# Patient Record
Sex: MALE | Race: BLACK OR AFRICAN AMERICAN | NOT HISPANIC OR LATINO | Employment: OTHER | URBAN - METROPOLITAN AREA
[De-identification: names, ages, dates, MRNs, and addresses within clinical notes are randomized per-mention and may not be internally consistent; named-entity substitution may affect disease eponyms.]

---

## 2018-06-21 LAB
CHOLEST SERPL-MSCNC: 192 MG/DL (ref 100–200)
HBA1C MFR BLD: 6.2 % (ref 4.8–5.9)
HDLC SERPL-MCNC: 112 MG/DL (ref 55–100)
LDL/HDL RATIO: 0.6 (ref 1–3)
LDLC SERPL CALC-MCNC: 72 MG/DL (ref 0–100)
TRIGL SERPL-MCNC: 41 MG/DL (ref 0–150)

## 2018-09-30 PROBLEM — I63.412 EMBOLIC STROKE INVOLVING LEFT MIDDLE CEREBRAL ARTERY: Status: ACTIVE | Noted: 2018-09-30

## 2019-02-15 RX ORDER — METOPROLOL SUCCINATE 25 MG/1
25 TABLET, EXTENDED RELEASE ORAL DAILY
COMMUNITY
End: 2019-05-17 | Stop reason: SDUPTHER

## 2019-02-15 RX ORDER — BACLOFEN 10 MG/1
10 TABLET ORAL 3 TIMES DAILY
COMMUNITY
End: 2019-02-19 | Stop reason: SDUPTHER

## 2019-02-15 RX ORDER — AMLODIPINE BESYLATE 5 MG/1
5 TABLET ORAL DAILY
COMMUNITY
End: 2019-11-19 | Stop reason: SDUPTHER

## 2019-02-15 RX ORDER — FINASTERIDE 5 MG/1
5 TABLET, FILM COATED ORAL DAILY
COMMUNITY
End: 2019-03-15 | Stop reason: SDUPTHER

## 2019-02-15 RX ORDER — ASPIRIN 81 MG/1
81 TABLET ORAL DAILY
COMMUNITY

## 2019-02-15 RX ORDER — ATORVASTATIN CALCIUM 40 MG/1
40 TABLET, FILM COATED ORAL DAILY
COMMUNITY
End: 2019-05-02 | Stop reason: SDUPTHER

## 2019-02-15 RX ORDER — HYDROCHLOROTHIAZIDE 25 MG/1
25 TABLET ORAL DAILY
COMMUNITY
End: 2019-11-11 | Stop reason: SDUPTHER

## 2019-02-15 RX ORDER — CLOPIDOGREL BISULFATE 75 MG/1
75 TABLET ORAL DAILY
COMMUNITY
End: 2019-05-02 | Stop reason: SDUPTHER

## 2019-02-15 RX ORDER — METFORMIN HYDROCHLORIDE 500 MG/1
500 TABLET ORAL DAILY
COMMUNITY
End: 2019-08-16 | Stop reason: SDUPTHER

## 2019-02-19 ENCOUNTER — OFFICE VISIT (OUTPATIENT)
Dept: FAMILY MEDICINE | Facility: CLINIC | Age: 74
End: 2019-02-19
Payer: MEDICARE

## 2019-02-19 VITALS
TEMPERATURE: 98 F | SYSTOLIC BLOOD PRESSURE: 144 MMHG | DIASTOLIC BLOOD PRESSURE: 92 MMHG | HEART RATE: 65 BPM | WEIGHT: 161.38 LBS | OXYGEN SATURATION: 97 %

## 2019-02-19 DIAGNOSIS — I63.412 EMBOLIC STROKE INVOLVING LEFT MIDDLE CEREBRAL ARTERY: Primary | ICD-10-CM

## 2019-02-19 DIAGNOSIS — M41.9 SCOLIOSIS, UNSPECIFIED SCOLIOSIS TYPE, UNSPECIFIED SPINAL REGION: ICD-10-CM

## 2019-02-19 DIAGNOSIS — I10 HYPERTENSION, UNSPECIFIED TYPE: ICD-10-CM

## 2019-02-19 PROCEDURE — 3080F PR MOST RECENT DIASTOLIC BLOOD PRESSURE >= 90 MM HG: ICD-10-PCS | Mod: CPTII,,, | Performed by: FAMILY MEDICINE

## 2019-02-19 PROCEDURE — 99213 OFFICE O/P EST LOW 20 MIN: CPT | Mod: ,,, | Performed by: FAMILY MEDICINE

## 2019-02-19 PROCEDURE — 3077F PR MOST RECENT SYSTOLIC BLOOD PRESSURE >= 140 MM HG: ICD-10-PCS | Mod: CPTII,,, | Performed by: FAMILY MEDICINE

## 2019-02-19 PROCEDURE — 3077F SYST BP >= 140 MM HG: CPT | Mod: CPTII,,, | Performed by: FAMILY MEDICINE

## 2019-02-19 PROCEDURE — 99213 PR OFFICE/OUTPT VISIT, EST, LEVL III, 20-29 MIN: ICD-10-PCS | Mod: ,,, | Performed by: FAMILY MEDICINE

## 2019-02-19 PROCEDURE — 3080F DIAST BP >= 90 MM HG: CPT | Mod: CPTII,,, | Performed by: FAMILY MEDICINE

## 2019-02-19 RX ORDER — BACLOFEN 10 MG/1
10 TABLET ORAL 3 TIMES DAILY
Qty: 90 TABLET | Refills: 3 | Status: SHIPPED | OUTPATIENT
Start: 2019-02-19 | End: 2019-11-25 | Stop reason: SDUPTHER

## 2019-02-19 NOTE — PROGRESS NOTES
Subjective:       Patient ID: Rene Fernandez is a 73 y.o. male.    Chief Complaint: Leg Pain (Pt stated that he has right leg pain especially in right knee.); Knee Pain (Pt stated that when he sits or lays down too long his right knee gets stiff & he has to push on it to straighten out.); and Pelvic Pain (Pt would like a brace ordered & an upright walker so that he may get around easier.)    74 yo M here for  Pt also tells me that I was correct with questioning his dx of scoliosis - pt found out that he has subluxation which is worsening steadily.   Pt also wants a back brace and a walker for his scoliosis and subluxation.  Pt had a stroke 6 months ago and he would like to have his carotids ultrasounded again as he had some stenosis 6 months ago.       Review of Systems   Constitutional: Negative for chills, fatigue and fever.   HENT: Negative for congestion, drooling, sneezing and sore throat.    Eyes: Negative for pain and visual disturbance.   Respiratory: Negative for cough and shortness of breath.    Cardiovascular: Negative for chest pain.   Gastrointestinal: Negative for abdominal pain, constipation, diarrhea and nausea.   Endocrine: Negative for cold intolerance and heat intolerance.   Genitourinary: Negative for difficulty urinating and frequency.   Musculoskeletal: Negative for myalgias.   Allergic/Immunologic: Negative for food allergies.   Neurological: Negative for seizures and headaches.   Psychiatric/Behavioral: Negative for behavioral problems.       Objective:      Physical Exam   Constitutional: He appears well-developed.   HENT:   Right Ear: External ear normal.   Left Ear: External ear normal.   Mouth/Throat: Oropharynx is clear and moist.   Eyes: Conjunctivae and EOM are normal.   Neck: Normal range of motion.   Cardiovascular: Normal rate, regular rhythm and intact distal pulses.   Pulmonary/Chest: Effort normal and breath sounds normal.   Abdominal: Soft.   Musculoskeletal: Normal range of  motion.   Neurological: He is alert.   Skin: Skin is warm. Capillary refill takes less than 2 seconds.   Psychiatric: He has a normal mood and affect.   Nursing note and vitals reviewed.      Assessment:       No diagnosis found.    Plan:       PROBLEM LIST  No problem-specific Assessment & Plan notes found for this encounter.        There are no diagnoses linked to this encounter.

## 2019-03-18 RX ORDER — FINASTERIDE 5 MG/1
TABLET, FILM COATED ORAL
Qty: 90 TABLET | Refills: 3 | Status: SHIPPED | OUTPATIENT
Start: 2019-03-18 | End: 2020-03-27

## 2019-05-02 ENCOUNTER — OFFICE VISIT (OUTPATIENT)
Dept: FAMILY MEDICINE | Facility: CLINIC | Age: 74
End: 2019-05-02
Payer: MEDICARE

## 2019-05-02 VITALS
BODY MASS INDEX: 23.66 KG/M2 | HEIGHT: 68 IN | HEART RATE: 84 BPM | SYSTOLIC BLOOD PRESSURE: 134 MMHG | DIASTOLIC BLOOD PRESSURE: 84 MMHG | WEIGHT: 156.13 LBS | TEMPERATURE: 97 F | OXYGEN SATURATION: 98 %

## 2019-05-02 DIAGNOSIS — Z86.73 HISTORY OF CVA IN ADULTHOOD: ICD-10-CM

## 2019-05-02 DIAGNOSIS — M62.830 MUSCLE SPASM OF BACK: Primary | ICD-10-CM

## 2019-05-02 DIAGNOSIS — R26.9 GAIT DIFFICULTY: ICD-10-CM

## 2019-05-02 DIAGNOSIS — M62.838 MUSCLE SPASM OF BOTH LOWER LEGS: ICD-10-CM

## 2019-05-02 DIAGNOSIS — I77.9 CAROTID ARTERY DISEASE, UNSPECIFIED LATERALITY, UNSPECIFIED TYPE: ICD-10-CM

## 2019-05-02 DIAGNOSIS — E11.9 TYPE 2 DIABETES MELLITUS WITHOUT COMPLICATION, WITHOUT LONG-TERM CURRENT USE OF INSULIN: ICD-10-CM

## 2019-05-02 DIAGNOSIS — I10 ESSENTIAL HYPERTENSION: ICD-10-CM

## 2019-05-02 PROCEDURE — 3079F DIAST BP 80-89 MM HG: CPT | Mod: CPTII,S$GLB,, | Performed by: FAMILY MEDICINE

## 2019-05-02 PROCEDURE — 3079F PR MOST RECENT DIASTOLIC BLOOD PRESSURE 80-89 MM HG: ICD-10-PCS | Mod: CPTII,S$GLB,, | Performed by: FAMILY MEDICINE

## 2019-05-02 PROCEDURE — 99213 PR OFFICE/OUTPT VISIT, EST, LEVL III, 20-29 MIN: ICD-10-PCS | Mod: S$GLB,,, | Performed by: FAMILY MEDICINE

## 2019-05-02 PROCEDURE — 3075F SYST BP GE 130 - 139MM HG: CPT | Mod: CPTII,S$GLB,, | Performed by: FAMILY MEDICINE

## 2019-05-02 PROCEDURE — 1101F PR PT FALLS ASSESS DOC 0-1 FALLS W/OUT INJ PAST YR: ICD-10-PCS | Mod: CPTII,S$GLB,, | Performed by: FAMILY MEDICINE

## 2019-05-02 PROCEDURE — 99213 OFFICE O/P EST LOW 20 MIN: CPT | Mod: S$GLB,,, | Performed by: FAMILY MEDICINE

## 2019-05-02 PROCEDURE — 1101F PT FALLS ASSESS-DOCD LE1/YR: CPT | Mod: CPTII,S$GLB,, | Performed by: FAMILY MEDICINE

## 2019-05-02 PROCEDURE — 3075F PR MOST RECENT SYSTOLIC BLOOD PRESS GE 130-139MM HG: ICD-10-PCS | Mod: CPTII,S$GLB,, | Performed by: FAMILY MEDICINE

## 2019-05-02 RX ORDER — ATORVASTATIN CALCIUM 40 MG/1
40 TABLET, FILM COATED ORAL DAILY
Qty: 90 TABLET | Refills: 3 | Status: SHIPPED | OUTPATIENT
Start: 2019-05-02 | End: 2019-05-17 | Stop reason: SDUPTHER

## 2019-05-02 RX ORDER — TIZANIDINE HYDROCHLORIDE 4 MG/1
4 CAPSULE, GELATIN COATED ORAL NIGHTLY PRN
Qty: 30 CAPSULE | Refills: 3 | Status: SHIPPED | OUTPATIENT
Start: 2019-05-02 | End: 2019-06-01

## 2019-05-02 RX ORDER — CLOPIDOGREL BISULFATE 75 MG/1
75 TABLET ORAL DAILY
Qty: 90 TABLET | Refills: 3 | Status: SHIPPED | OUTPATIENT
Start: 2019-05-02 | End: 2019-05-17 | Stop reason: SDUPTHER
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
## 2019-05-17 ENCOUNTER — OFFICE VISIT (OUTPATIENT)
Dept: FAMILY MEDICINE | Facility: CLINIC | Age: 74
End: 2019-05-17
Payer: MEDICARE

## 2019-05-17 VITALS
BODY MASS INDEX: 24.13 KG/M2 | DIASTOLIC BLOOD PRESSURE: 82 MMHG | SYSTOLIC BLOOD PRESSURE: 128 MMHG | HEART RATE: 58 BPM | HEIGHT: 68 IN | WEIGHT: 159.25 LBS | TEMPERATURE: 98 F | OXYGEN SATURATION: 96 %

## 2019-05-17 DIAGNOSIS — Z86.73 HISTORY OF CVA IN ADULTHOOD: ICD-10-CM

## 2019-05-17 DIAGNOSIS — M41.9 SCOLIOSIS, UNSPECIFIED SCOLIOSIS TYPE, UNSPECIFIED SPINAL REGION: Primary | ICD-10-CM

## 2019-05-17 DIAGNOSIS — E11.9 TYPE 2 DIABETES MELLITUS WITHOUT COMPLICATION, WITHOUT LONG-TERM CURRENT USE OF INSULIN: ICD-10-CM

## 2019-05-17 DIAGNOSIS — I77.9 CAROTID ARTERY DISEASE, UNSPECIFIED LATERALITY, UNSPECIFIED TYPE: ICD-10-CM

## 2019-05-17 DIAGNOSIS — R26.9 GAIT DIFFICULTY: ICD-10-CM

## 2019-05-17 PROCEDURE — 99213 OFFICE O/P EST LOW 20 MIN: CPT | Mod: S$GLB,,, | Performed by: FAMILY MEDICINE

## 2019-05-17 PROCEDURE — 3079F DIAST BP 80-89 MM HG: CPT | Mod: CPTII,S$GLB,, | Performed by: FAMILY MEDICINE

## 2019-05-17 PROCEDURE — 1101F PT FALLS ASSESS-DOCD LE1/YR: CPT | Mod: CPTII,S$GLB,, | Performed by: FAMILY MEDICINE

## 2019-05-17 PROCEDURE — 3079F PR MOST RECENT DIASTOLIC BLOOD PRESSURE 80-89 MM HG: ICD-10-PCS | Mod: CPTII,S$GLB,, | Performed by: FAMILY MEDICINE

## 2019-05-17 PROCEDURE — 1101F PR PT FALLS ASSESS DOC 0-1 FALLS W/OUT INJ PAST YR: ICD-10-PCS | Mod: CPTII,S$GLB,, | Performed by: FAMILY MEDICINE

## 2019-05-17 PROCEDURE — 99213 PR OFFICE/OUTPT VISIT, EST, LEVL III, 20-29 MIN: ICD-10-PCS | Mod: S$GLB,,, | Performed by: FAMILY MEDICINE

## 2019-05-17 PROCEDURE — 3074F SYST BP LT 130 MM HG: CPT | Mod: CPTII,S$GLB,, | Performed by: FAMILY MEDICINE

## 2019-05-17 PROCEDURE — 3074F PR MOST RECENT SYSTOLIC BLOOD PRESSURE < 130 MM HG: ICD-10-PCS | Mod: CPTII,S$GLB,, | Performed by: FAMILY MEDICINE

## 2019-05-17 RX ORDER — ATORVASTATIN CALCIUM 40 MG/1
40 TABLET, FILM COATED ORAL DAILY
Qty: 90 TABLET | Refills: 3 | Status: SHIPPED | OUTPATIENT
Start: 2019-05-17

## 2019-05-17 RX ORDER — METOPROLOL SUCCINATE 25 MG/1
25 TABLET, EXTENDED RELEASE ORAL DAILY
Qty: 90 TABLET | Refills: 3 | Status: SHIPPED | OUTPATIENT
Start: 2019-05-17

## 2019-05-17 RX ORDER — CLOPIDOGREL BISULFATE 75 MG/1
75 TABLET ORAL DAILY
Qty: 90 TABLET | Refills: 3 | Status: SHIPPED | OUTPATIENT
Start: 2019-05-17

## 2019-05-17 NOTE — PROGRESS NOTES
Subjective:       Patient ID: Rene Fernandez is a 73 y.o. male.    Chief Complaint: Right leg pain (Pt stated that he has sharp & constant pain that starts in his pelvic area and goes down to his ankle.); Left leg pain (Pt stated that he has occassional sharp pain that starts in his left knee that goes down to his left ankle.); Muscle Pain (Pt stated that he has constant muscle pain and would like to know if he needs blood work to see if there is something wrong besides inflammation.); and Medication Refill (Pt stated that he has no refills on Metoprolol, Atorvastatin, and Clopidogril. Pt was wondering if he still needs to take those meds.)    74 yo M here for f/u on pain 2ry to some subluxation issues in his back - pt has scoliosis and walks with a walker. Pt has had these problems for many years. [facility] and he has been seen by a chiropractor once a week. Pt stated that he has sharp & constant pain that starts in his pelvic area and goes down to his ankle.     Left leg pain: Pt stated that he has occassional sharp pain that starts in his left knee that goes down to his left ankle.  Muscle Pain: Pt stated that he has constant muscle pain and would like to know if he needs blood work to see if there is something wrong besides inflammation.  Medication Refill: Pt stated that he has no refills on Metoprolol, Atorvastatin, and Clopidogrel. Pt was wondering if he still needs to take those meds.    Pt has access to pool therapy and stretching PT as well as [facility] and [facility] but he has not transportation.       Review of Systems   Constitutional: Negative for chills, fatigue and fever.   HENT: Negative for congestion, drooling, sneezing and sore throat.    Eyes: Negative for pain and visual disturbance.   Respiratory: Negative for cough and shortness of breath.    Cardiovascular: Negative for chest pain.   Gastrointestinal: Negative for abdominal pain, constipation, diarrhea and  nausea.   Endocrine: Negative for cold intolerance and heat intolerance.   Genitourinary: Negative for difficulty urinating and frequency.   Musculoskeletal: Negative for myalgias.   Allergic/Immunologic: Negative for food allergies.   Neurological: Negative for seizures and headaches.   Psychiatric/Behavioral: Negative for behavioral problems.       Objective:      Physical Exam   Constitutional: He appears well-developed.   HENT:   Right Ear: External ear normal.   Left Ear: External ear normal.   Mouth/Throat: Oropharynx is clear and moist.   Eyes: Conjunctivae and EOM are normal.   Neck: Normal range of motion.   Cardiovascular: Normal rate, regular rhythm and intact distal pulses.   Pulmonary/Chest: Effort normal and breath sounds normal.   Abdominal: Soft.   Musculoskeletal: Normal range of motion. He exhibits deformity.   Neurological: He is alert.   Skin: Skin is warm. Capillary refill takes less than 2 seconds.   Psychiatric: He has a normal mood and affect.   Nursing note and vitals reviewed.      Assessment:       1. Scoliosis, unspecified scoliosis type, unspecified spinal region    2. Type 2 diabetes mellitus without complication, without long-term current use of insulin    3. Gait difficulty    4. History of CVA in adulthood    5. Carotid artery disease, unspecified laterality, unspecified type        Plan:       PROBLEM LIST     Rene was seen today for right leg pain, left leg pain, muscle pain and medication refill.    Diagnoses and all orders for this visit:    Scoliosis, unspecified scoliosis type, unspecified spinal region    Type 2 diabetes mellitus without complication, without long-term current use of insulin    Gait difficulty    History of CVA in adulthood  Comments:  middle cerebral artery  Orders:  -     metoprolol succinate (TOPROL-XL) 25 MG 24 hr tablet; Take 1 tablet (25 mg total) by mouth once daily.  -     atorvastatin (LIPITOR) 40 MG tablet; Take 1 tablet (40 mg total) by mouth once  daily.  -     clopidogrel (PLAVIX) 75 mg tablet; Take 1 tablet (75 mg total) by mouth once daily.    Carotid artery disease, unspecified laterality, unspecified type  -     metoprolol succinate (TOPROL-XL) 25 MG 24 hr tablet; Take 1 tablet (25 mg total) by mouth once daily.  -     atorvastatin (LIPITOR) 40 MG tablet; Take 1 tablet (40 mg total) by mouth once daily.  -     clopidogrel (PLAVIX) 75 mg tablet; Take 1 tablet (75 mg total) by mouth once daily.    1) I refilled your meds that you were out of  2) find a way to your work-out/physical therapy/stretching station some how - this will help you most  3) chiropractor is also good  4) get the imaging (ie CT scan, MRI, xrays - anything) that helps us find out what your actual condition is - so we can refer you

## 2019-08-18 RX ORDER — METFORMIN HYDROCHLORIDE 500 MG/1
TABLET ORAL
Qty: 90 TABLET | Refills: 1 | Status: SHIPPED | OUTPATIENT
Start: 2019-08-18

## 2019-11-11 RX ORDER — HYDROCHLOROTHIAZIDE 25 MG/1
TABLET ORAL
Qty: 90 TABLET | Refills: 3 | Status: SHIPPED | OUTPATIENT
Start: 2019-11-11

## 2019-11-22 RX ORDER — AMLODIPINE BESYLATE 5 MG/1
TABLET ORAL
Qty: 90 TABLET | Refills: 3 | Status: SHIPPED | OUTPATIENT
Start: 2019-11-22

## 2019-11-25 DIAGNOSIS — M41.9 SCOLIOSIS, UNSPECIFIED SCOLIOSIS TYPE, UNSPECIFIED SPINAL REGION: ICD-10-CM

## 2019-11-26 RX ORDER — BACLOFEN 10 MG/1
10 TABLET ORAL
Qty: 90 TABLET | Refills: 3 | Status: SHIPPED | OUTPATIENT
Start: 2019-11-26

## 2020-03-27 RX ORDER — FINASTERIDE 5 MG/1
TABLET, FILM COATED ORAL
Qty: 90 TABLET | Refills: 0 | Status: SHIPPED | OUTPATIENT
Start: 2020-03-27

## 2020-04-16 ENCOUNTER — PATIENT OUTREACH (OUTPATIENT)
Dept: ADMINISTRATIVE | Facility: HOSPITAL | Age: 75
End: 2020-04-16

## 2020-04-16 NOTE — PROGRESS NOTES
LINKS updated minus SSN. Immunizations abstracted. New immunizations added. HM updated. Care Everywhere abstracted;enter/edited hga1c & lipid from 2018  [facility]: no new records found [facility]: no new records found [facility]: no new records found.  *KDL*

## 2020-05-17 DIAGNOSIS — I77.9 CAROTID ARTERY DISEASE, UNSPECIFIED LATERALITY, UNSPECIFIED TYPE: ICD-10-CM

## 2020-05-17 DIAGNOSIS — Z86.73 HISTORY OF CVA IN ADULTHOOD: ICD-10-CM

## 2020-05-18 RX ORDER — ATORVASTATIN CALCIUM 40 MG/1
TABLET, FILM COATED ORAL
Qty: 90 TABLET | Refills: 0 | OUTPATIENT
Start: 2020-05-18

## 2020-05-27 DIAGNOSIS — I77.9 CAROTID ARTERY DISEASE, UNSPECIFIED LATERALITY, UNSPECIFIED TYPE: ICD-10-CM

## 2020-05-27 DIAGNOSIS — Z86.73 HISTORY OF CVA IN ADULTHOOD: ICD-10-CM

## 2020-05-27 RX ORDER — ATORVASTATIN CALCIUM 40 MG/1
TABLET, FILM COATED ORAL
Qty: 60 TABLET | Refills: 0 | OUTPATIENT
Start: 2020-05-27

## 2020-06-03 DIAGNOSIS — I77.9 CAROTID ARTERY DISEASE, UNSPECIFIED LATERALITY, UNSPECIFIED TYPE: ICD-10-CM

## 2020-06-03 DIAGNOSIS — Z86.73 HISTORY OF CVA IN ADULTHOOD: ICD-10-CM

## 2020-06-03 RX ORDER — ATORVASTATIN CALCIUM 40 MG/1
TABLET, FILM COATED ORAL
Qty: 60 TABLET | Refills: 0 | OUTPATIENT
Start: 2020-06-03

## 2020-06-04 DIAGNOSIS — Z86.73 HISTORY OF CVA IN ADULTHOOD: ICD-10-CM

## 2020-06-04 DIAGNOSIS — I77.9 CAROTID ARTERY DISEASE, UNSPECIFIED LATERALITY, UNSPECIFIED TYPE: ICD-10-CM

## 2020-06-04 RX ORDER — ATORVASTATIN CALCIUM 40 MG/1
TABLET, FILM COATED ORAL
Qty: 90 TABLET | Refills: 0 | OUTPATIENT
Start: 2020-06-04

## 2020-06-12 DIAGNOSIS — Z86.73 HISTORY OF CVA IN ADULTHOOD: ICD-10-CM

## 2020-06-12 DIAGNOSIS — I77.9 CAROTID ARTERY DISEASE, UNSPECIFIED LATERALITY, UNSPECIFIED TYPE: ICD-10-CM

## 2020-06-14 RX ORDER — CLOPIDOGREL BISULFATE 75 MG/1
TABLET ORAL
Qty: 90 TABLET | Refills: 0 | OUTPATIENT
Start: 2020-06-14

## 2020-07-02 RX ORDER — FINASTERIDE 5 MG/1
TABLET, FILM COATED ORAL
Qty: 90 TABLET | Refills: 0 | OUTPATIENT
Start: 2020-07-02

## 2022-11-07 ENCOUNTER — TELEPHONE (OUTPATIENT)
Dept: UROLOGY | Facility: CLINIC | Age: 77
End: 2022-11-07
Payer: OTHER GOVERNMENT

## 2022-11-07 DIAGNOSIS — R33.9 UNABLE TO PASS URINE: Primary | ICD-10-CM

## 2022-11-07 NOTE — TELEPHONE ENCOUNTER
----- Message from Luanne Kaiser sent at 11/7/2022  9:26 AM CST -----  Contact: [place] (spouse to Rene Fernandez)  Type:  Sooner Apoointment Request    Caller is requesting a sooner appointment.  Caller declined first available appointment listed below.  Caller will not accept being placed on the waitlist and is requesting a message be sent to doctor.  Name of Caller: [place] (spouse to Rene Fernandez)   When is the first available appointment? 1/5/2023 w/ Dr Gardner  Symptoms: enlarged prostate, has a catheter and it is causing him to be irritiable  Would the patient rather a call back or a response via [facility]?  Call back   Best Call Back Number: [phone]  Additional Information: N/a

## 2022-11-11 ENCOUNTER — OFFICE VISIT (OUTPATIENT)
Dept: UROLOGY | Facility: CLINIC | Age: 77
End: 2022-11-11
Payer: MEDICARE

## 2022-11-11 VITALS — SYSTOLIC BLOOD PRESSURE: 176 MMHG | HEART RATE: 74 BPM | TEMPERATURE: 99 F | DIASTOLIC BLOOD PRESSURE: 74 MMHG

## 2022-11-11 DIAGNOSIS — R33.9 UNABLE TO PASS URINE: ICD-10-CM

## 2022-11-11 DIAGNOSIS — N40.0 BPH (BENIGN PROSTATIC HYPERPLASIA): Primary | ICD-10-CM

## 2022-11-11 DIAGNOSIS — N40.0 BENIGN PROSTATIC HYPERPLASIA, UNSPECIFIED WHETHER LOWER URINARY TRACT SYMPTOMS PRESENT: Primary | ICD-10-CM

## 2022-11-11 LAB
ANION GAP SERPL CALC-SCNC: 8 MMOL/L (ref 3–11)
APPEARANCE, UA: ABNORMAL
BACTERIA SPEC CULT: ABNORMAL /HPF
BASOPHILS NFR BLD: 0.8 % (ref 0–3)
BILIRUB UR QL STRIP: ABNORMAL MG/DL
BUN SERPL-MCNC: 18 MG/DL (ref 7–18)
BUN/CREAT SERPL: 16.36 RATIO (ref 7–18)
CALCIUM SERPL-MCNC: 9.3 MG/DL (ref 8.8–10.5)
CHLORIDE SERPL-SCNC: 103 MMOL/L (ref 100–108)
CO2 SERPL-SCNC: 28 MMOL/L (ref 21–32)
COLOR UR: ABNORMAL
CREAT SERPL-MCNC: 1.1 MG/DL (ref 0.7–1.3)
EOSINOPHIL NFR BLD: 1.4 % (ref 1–3)
ERYTHROCYTE [DISTWIDTH] IN BLOOD BY AUTOMATED COUNT: 13.8 % (ref 12.5–18)
GFR ESTIMATION: > 60
GLUCOSE (UA): NORMAL MG/DL
GLUCOSE SERPL-MCNC: 84 MG/DL (ref 70–110)
HCT VFR BLD AUTO: 39.7 % (ref 42–52)
HGB BLD-MCNC: 12.5 G/DL (ref 14–18)
HGB UR QL STRIP: 250 /UL
KETONES UR QL STRIP: NEGATIVE MG/DL
LEUKOCYTE ESTERASE UR QL STRIP: 25 /UL
LYMPHOCYTES NFR BLD: 28.8 % (ref 25–40)
MCH RBC QN AUTO: 27.8 PG (ref 27–31.2)
MCHC RBC AUTO-ENTMCNC: 31.5 G/DL (ref 31.8–35.4)
MCV RBC AUTO: 88.4 FL (ref 80–97)
MONOCYTES NFR BLD: 10.1 % (ref 1–15)
NEUTROPHILS # BLD AUTO: 2.83 10*3/UL (ref 1.8–7.7)
NEUTROPHILS NFR BLD: 58.7 % (ref 37–80)
NITRITE UR QL STRIP: POSITIVE
NUCLEATED RED BLOOD CELLS: 0 %
PH UR STRIP: 5 PH (ref 5–9)
PLATELETS: 207 10*3/UL (ref 142–424)
POTASSIUM SERPL-SCNC: 3.6 MMOL/L (ref 3.6–5.2)
PROT UR QL STRIP: ABNORMAL MG/DL
RBC # BLD AUTO: 4.49 10*6/UL (ref 4.7–6.1)
RBC #/AREA URNS HPF: ABNORMAL /HPF (ref 0–2)
SERVICE COMMENT 03: ABNORMAL
SODIUM BLD-SCNC: 139 MMOL/L (ref 135–145)
SP GR UR STRIP: 1.01 (ref 1–1.03)
SPECIMEN COLLECTION METHOD, URINE: ABNORMAL
SQUAMOUS EPITHELIAL, UA: ABNORMAL /LPF
UROBILINOGEN UR STRIP-ACNC: 4 MG/DL
WBC # BLD: 4.8 10*3/UL (ref 4.6–10.2)
WBC #/AREA URNS HPF: ABNORMAL /HPF (ref 0–5)

## 2022-11-11 PROCEDURE — 99205 OFFICE O/P NEW HI 60 MIN: CPT | Mod: S$GLB,,, | Performed by: UROLOGY

## 2022-11-11 PROCEDURE — 1159F PR MEDICATION LIST DOCUMENTED IN MEDICAL RECORD: ICD-10-PCS | Mod: CPTII,S$GLB,, | Performed by: UROLOGY

## 2022-11-11 PROCEDURE — 99205 PR OFFICE/OUTPT VISIT, NEW, LEVL V, 60-74 MIN: ICD-10-PCS | Mod: S$GLB,,, | Performed by: UROLOGY

## 2022-11-11 PROCEDURE — 3077F SYST BP >= 140 MM HG: CPT | Mod: CPTII,S$GLB,, | Performed by: UROLOGY

## 2022-11-11 PROCEDURE — 3078F DIAST BP <80 MM HG: CPT | Mod: CPTII,S$GLB,, | Performed by: UROLOGY

## 2022-11-11 PROCEDURE — 1159F MED LIST DOCD IN RCRD: CPT | Mod: CPTII,S$GLB,, | Performed by: UROLOGY

## 2022-11-11 PROCEDURE — 1160F RVW MEDS BY RX/DR IN RCRD: CPT | Mod: CPTII,S$GLB,, | Performed by: UROLOGY

## 2022-11-11 PROCEDURE — 3078F PR MOST RECENT DIASTOLIC BLOOD PRESSURE < 80 MM HG: ICD-10-PCS | Mod: CPTII,S$GLB,, | Performed by: UROLOGY

## 2022-11-11 PROCEDURE — 1160F PR REVIEW ALL MEDS BY PRESCRIBER/CLIN PHARMACIST DOCUMENTED: ICD-10-PCS | Mod: CPTII,S$GLB,, | Performed by: UROLOGY

## 2022-11-11 PROCEDURE — 3077F PR MOST RECENT SYSTOLIC BLOOD PRESSURE >= 140 MM HG: ICD-10-PCS | Mod: CPTII,S$GLB,, | Performed by: UROLOGY

## 2022-11-11 RX ORDER — PHENAZOPYRIDINE HYDROCHLORIDE 200 MG/1
200 TABLET, FILM COATED ORAL 3 TIMES DAILY PRN
Qty: 30 TABLET | Refills: 0 | Status: SHIPPED | OUTPATIENT
Start: 2022-11-11 | End: 2022-11-21

## 2022-11-11 RX ORDER — CLOBETASOL PROPIONATE 0.5 MG/G
CREAM TOPICAL
COMMUNITY
Start: 2022-09-03

## 2022-11-11 RX ORDER — MELOXICAM 7.5 MG/1
7.5 TABLET ORAL 2 TIMES DAILY PRN
COMMUNITY
Start: 2022-10-11

## 2022-11-11 NOTE — PROGRESS NOTES
Subjective:       Patient ID: Rene Fernandez is a 77 y.o. male.    Chief Complaint: No chief complaint on file.      HPI:  77-year-old male with long history of BPH with obstructive symptoms patient has been on finasteride for quite some time but developed acute urinary retention requiring Hui catheter placement this was proximally a week ago    Past Medical History:   Past Medical History:   Diagnosis Date    Chronic kidney disease     Hyperlipidemia     Hypertension     Lumbar radiculopathy     Prediabetes     Scoliosis deformity of spine     Stroke        Past Surgical Historical:   Past Surgical History:   Procedure Laterality Date    EXCISIONAL HEMORRHOIDECTOMY      PROSTATE BIOPSY          Medications:   Medication List with Changes/Refills   Current Medications    AMLODIPINE (NORVASC) 5 MG TABLET    TAKE 1 TABLET BY MOUTH ONCE DAILY    ASPIRIN (ECOTRIN) 81 MG EC TABLET    Take 81 mg by mouth once daily.    ATORVASTATIN (LIPITOR) 40 MG TABLET    Take 1 tablet (40 mg total) by mouth once daily.    BACLOFEN (LIORESAL) 10 MG TABLET    Take 1 tablet (10 mg total) by mouth after meals as needed (for pain).    CLOBETASOL (TEMOVATE) 0.05 % CREAM    APPLY LIBERALLY TO SKIN TWICE A DAY AS NEEDED    CLOPIDOGREL (PLAVIX) 75 MG TABLET    Take 1 tablet (75 mg total) by mouth once daily.    FINASTERIDE (PROSCAR) 5 MG TABLET    Take 1 tablet by mouth once daily    HYDROCHLOROTHIAZIDE (HYDRODIURIL) 25 MG TABLET    TAKE 1 TABLET BY MOUTH ONCE DAILY    MELOXICAM (MOBIC) 7.5 MG TABLET    Take 7.5 mg by mouth 2 (two) times daily as needed.    METFORMIN (GLUCOPHAGE) 500 MG TABLET    TAKE 1 TABLET BY MOUTH ONCE DAILY    METOPROLOL SUCCINATE (TOPROL-XL) 25 MG 24 HR TABLET    Take 1 tablet (25 mg total) by mouth once daily.    WALKER MISC    Use when walking        Past Social History:   Social History     Socioeconomic History    Marital status:    Occupational History    Occupation: retired   Tobacco Use    Smoking status:  Never    Smokeless tobacco: Never   Substance and Sexual Activity    Alcohol use: No    Drug use: No       Allergies:   Review of patient's allergies indicates:   Allergen Reactions    Levaquin [levofloxacin]         Family History:   Family History   Family history unknown: Yes        Review of Systems:  Review of Systems   Constitutional:  Negative for activity change and appetite change.   HENT:  Negative for congestion and dental problem.    Eyes:  Negative for visual disturbance.   Respiratory:  Negative for chest tightness and shortness of breath.    Cardiovascular:  Negative for chest pain.   Gastrointestinal:  Negative for abdominal distention and abdominal pain.   Endocrine: Negative for polyuria.   Genitourinary:  Negative for difficulty urinating, dysuria, flank pain, frequency, hematuria, penile discharge, penile pain, penile swelling, scrotal swelling, testicular pain and urgency.   Musculoskeletal:  Negative for back pain and neck pain.   Skin:  Negative for color change.   Allergic/Immunologic: Positive for immunocompromised state.   Neurological:  Negative for dizziness.   Hematological:  Negative for adenopathy.   Psychiatric/Behavioral:  Negative for agitation, behavioral problems and confusion.      Physical Exam:  Physical Exam  Constitutional:       General: He is not in acute distress.     Appearance: He is well-developed.   HENT:      Head: Normocephalic and atraumatic.      Nose: Nose normal.   Eyes:      General: No scleral icterus.     Conjunctiva/sclera: Conjunctivae normal.      Pupils: Pupils are equal, round, and reactive to light.   Neck:      Thyroid: No thyromegaly.      Trachea: No tracheal deviation.   Cardiovascular:      Rate and Rhythm: Normal rate and regular rhythm.      Heart sounds: Normal heart sounds.   Pulmonary:      Effort: Pulmonary effort is normal. No respiratory distress.      Breath sounds: Normal breath sounds. No wheezing or rales.   Abdominal:      General:  Bowel sounds are normal. There is no distension.      Palpations: Abdomen is soft.      Tenderness: There is no abdominal tenderness. There is no guarding or rebound.   Genitourinary:     Penis: Normal. No tenderness.       Prostate: Normal.   Musculoskeletal:         General: No deformity. Normal range of motion.      Cervical back: Neck supple.   Lymphadenopathy:      Cervical: No cervical adenopathy.   Skin:     General: Skin is warm and dry.      Findings: No erythema or rash.   Neurological:      Mental Status: He is alert and oriented to person, place, and time.      Cranial Nerves: No cranial nerve deficit.   Psychiatric:         Behavior: Behavior normal.       Assessment/Plan:       Problem List Items Addressed This Visit    None  Visit Diagnoses       Benign prostatic hyperplasia, unspecified whether lower urinary tract symptoms present    -  Primary    Relevant Orders    Ambulatory Referral to External Surgery    Unable to pass urine                   Acute urinary retention with a long history of BPH:  We would a very long discussion about the causes of urinary retention the fact that he has been on finasteride for some time but developed acute urinary retention despite that we will proceed with Transurethral resection of the prostate next available date

## 2022-11-11 NOTE — PROGRESS NOTES
Patient educated, consent signed, pre-admission paperwork given. Patient verbalized understanding. DOS 11/21/22 at [facility]. Omid

## 2022-11-13 LAB
ORGANISM: NORMAL
URINE CULTURE, ROUTINE: NORMAL

## 2022-11-15 ENCOUNTER — TELEPHONE (OUTPATIENT)
Dept: UROLOGY | Facility: CLINIC | Age: 77
End: 2022-11-15
Payer: OTHER GOVERNMENT

## 2022-11-15 NOTE — TELEPHONE ENCOUNTER
Advised blood thinner needs to be stopped 5-7 days prior to surgery and to not take metformin AM of surgery. Only BP meds with a small sip of water. Pt wife verbalized understanding. Lpn

## 2022-11-15 NOTE — TELEPHONE ENCOUNTER
----- Message from Marleny Salazar sent at 11/15/2022  9:22 AM CST -----  Contact: [place] Fernandez (Spouse  Type:  Needs Medical Advice    Who Called: [place] Fernandez /Spouse of Rene Fernandez     Would the patient rather a call back or a response via [facility]? Call back   Best Call Back Number: [phone] / [phone]  Additional Information: she needs to know what medicines he needs to stop taking before his surgery on Monday 11/21

## 2022-11-16 ENCOUNTER — TELEPHONE (OUTPATIENT)
Dept: UROLOGY | Facility: CLINIC | Age: 77
End: 2022-11-16
Payer: OTHER GOVERNMENT

## 2022-11-16 NOTE — TELEPHONE ENCOUNTER
Attempted to return call.     ----- Message from Ana Felton NP sent at 11/16/2022  3:30 PM CST -----  Contact: reggie lentz    ----- Message -----  From: Merlyn Pearce MA  Sent: 11/16/2022   3:25 PM CST  To: Ana Felton NP    Will you pretty please review his results! Thank you!  ----- Message -----  From: Kaitlyn Campo  Sent: 11/16/2022   1:57 PM CST  To: Jj Song Staff      Who Called:demar   Who Left Message for Patient:georgette   Does the patient know what this is regarding?:in regards to pt   Would the patient rather a call back or a response via [facility]?   Best Call Back Number:[phone]  Additional Information:

## 2022-11-21 ENCOUNTER — OUTSIDE PLACE OF SERVICE (OUTPATIENT)
Dept: UROLOGY | Facility: CLINIC | Age: 77
End: 2022-11-21

## 2022-11-21 ENCOUNTER — TELEPHONE (OUTPATIENT)
Dept: UROLOGY | Facility: CLINIC | Age: 77
End: 2022-11-21

## 2022-11-21 ENCOUNTER — OUTSIDE PLACE OF SERVICE (OUTPATIENT)
Dept: UROLOGY | Facility: CLINIC | Age: 77
End: 2022-11-21
Payer: OTHER GOVERNMENT

## 2022-11-21 DIAGNOSIS — N40.0 BENIGN PROSTATIC HYPERPLASIA, UNSPECIFIED WHETHER LOWER URINARY TRACT SYMPTOMS PRESENT: Primary | ICD-10-CM

## 2022-11-21 LAB
GLUCOSE SERPL-MCNC: 50 MG/DL (ref 70–105)
GLUCOSE SERPL-MCNC: 87 MG/DL (ref 70–105)

## 2022-11-21 PROCEDURE — 52001 PR CYSTOURETHROSCOPY W/IRRIG & EVAC CLOTS: ICD-10-PCS | Mod: 78,,, | Performed by: UROLOGY

## 2022-11-21 PROCEDURE — 52601 PROSTATECTOMY (TURP): CPT | Mod: ,,, | Performed by: UROLOGY

## 2022-11-21 PROCEDURE — 52001 CYSTO W/IRRG&EVAC MLT CLOTS: CPT | Mod: 78,,, | Performed by: UROLOGY

## 2022-11-21 PROCEDURE — 52601 PR TRANSURETHRAL ELEC-SURG PROSTATECTOM: ICD-10-PCS | Mod: ,,, | Performed by: UROLOGY

## 2022-11-21 RX ORDER — HYDROCODONE BITARTRATE AND ACETAMINOPHEN 10; 325 MG/1; MG/1
1 TABLET ORAL EVERY 6 HOURS PRN
Qty: 15 TABLET | Refills: 0 | Status: SHIPPED | OUTPATIENT
Start: 2022-11-21 | End: 2022-11-21

## 2022-11-21 RX ORDER — SULFAMETHOXAZOLE AND TRIMETHOPRIM 800; 160 MG/1; MG/1
1 TABLET ORAL 2 TIMES DAILY
Qty: 6 TABLET | Refills: 0 | Status: SHIPPED | OUTPATIENT
Start: 2022-11-21 | End: 2022-11-24

## 2022-11-21 RX ORDER — HYDROCODONE BITARTRATE AND ACETAMINOPHEN 10; 325 MG/1; MG/1
1 TABLET ORAL EVERY 6 HOURS PRN
Qty: 15 TABLET | Refills: 0 | Status: SHIPPED | OUTPATIENT
Start: 2022-11-21

## 2022-11-21 NOTE — TELEPHONE ENCOUNTER
Contacted spouse and advised that rx is out at [facility] of choice, and asked where would they like the meds to go. Spouse advised Michel, rx will be sent. Spouse verbalized understanding. BJP    ----- Message from Tona Younger sent at 11/21/2022 10:32 AM CST -----  Contact: Elba([facility])    ----- Message -----  From: Noam Baker LPN  Sent: 11/21/2022  10:17 AM CST  To: Jose Francisco Olson, #    Message sent to the wrong Provider.   ----- Message -----  From: Sonia Reeves  Sent: 11/21/2022   9:49 AM CST  To: Jose Francisco Olson    [facility] pharmacy called to consult with nurse or staff regarding a medication they are needing to discuss with the office. She states they are out of stock and medication needs to be sent elsewhere. If needing to reach you the best number is [phone]. Thanks/MR

## 2022-11-22 LAB — SPECIMEN TO PATHOLOGY: NORMAL

## 2022-11-23 ENCOUNTER — TELEPHONE (OUTPATIENT)
Dept: UROLOGY | Facility: CLINIC | Age: 77
End: 2022-11-23
Payer: OTHER GOVERNMENT

## 2022-11-23 DIAGNOSIS — N40.0 BENIGN PROSTATIC HYPERPLASIA, UNSPECIFIED WHETHER LOWER URINARY TRACT SYMPTOMS PRESENT: Primary | ICD-10-CM

## 2022-11-23 RX ORDER — CLOTRIMAZOLE AND BETAMETHASONE DIPROPIONATE 10; .64 MG/G; MG/G
CREAM TOPICAL 2 TIMES DAILY
Qty: 1 G | Refills: 0 | Status: SHIPPED | OUTPATIENT
Start: 2022-11-23 | End: 2023-03-15

## 2022-11-23 NOTE — TELEPHONE ENCOUNTER
Contacted pt in regards to message left with the call center. Pt stated that he has swelling of the penis and a rash around penis area. Notified Jose Francisco Jansen and Jj HART aware. Pt notified that the pt is experiencing penile swelling from the past two procedures recently done. Should resolve on its own. Medication cream sent out to pharmacy. Pt will notify if anything else changes. ED

## 2022-11-23 NOTE — TELEPHONE ENCOUNTER
----- Message from Marleny Salazar sent at 11/23/2022  8:11 AM CST -----  Contact: [place] Fernandez wife  Type:  Same Day Appointment Request    Caller is requesting a same day appointment.  Caller declined first available appointment listed below.    Name of Caller:[place] Fernandez wife of Rene Fernandez   When is the first available appointment?1/24  Symptoms:penis swelling, in pain   Best Call Back Number: [phone]   Additional Information:

## 2022-12-08 ENCOUNTER — OFFICE VISIT (OUTPATIENT)
Dept: UROLOGY | Facility: CLINIC | Age: 77
End: 2022-12-08
Payer: OTHER GOVERNMENT

## 2022-12-08 VITALS — WEIGHT: 159 LBS | HEIGHT: 68 IN | BODY MASS INDEX: 24.1 KG/M2 | RESPIRATION RATE: 18 BRPM

## 2022-12-08 DIAGNOSIS — R31.0 GROSS HEMATURIA: ICD-10-CM

## 2022-12-08 DIAGNOSIS — Z97.8 PRESENCE OF INDWELLING FOLEY CATHETER: ICD-10-CM

## 2022-12-08 DIAGNOSIS — Z98.890 STATUS POST CYSTOSCOPY: Primary | ICD-10-CM

## 2022-12-08 PROCEDURE — 99024 PR POST-OP FOLLOW-UP VISIT: ICD-10-PCS | Mod: S$GLB,,, | Performed by: NURSE PRACTITIONER

## 2022-12-08 PROCEDURE — 99024 POSTOP FOLLOW-UP VISIT: CPT | Mod: S$GLB,,, | Performed by: NURSE PRACTITIONER

## 2022-12-08 RX ORDER — OXYBUTYNIN CHLORIDE 5 MG/1
5 TABLET ORAL 3 TIMES DAILY
Qty: 90 TABLET | Refills: 0 | Status: SHIPPED | OUTPATIENT
Start: 2022-12-08 | End: 2023-12-08

## 2022-12-08 NOTE — PROGRESS NOTES
Subjective:       Patient ID: Rene Fernandez is a 77 y.o. male.    Chief Complaint: cath leaking; hematuria; s/p Cysto, clot evac; and Indwelling Hui      HPI: 77-year-old male known to service presents postop patient complaining of leakage around his urinary catheter.  He had large clot evacuation from the bladder and urethra recently in the hospital setting.  Was found have oozing around the bladder neck and prostate area and seminal vessel area this was all fulgurated.  Urine in the  bag today looks clear.  He has been afebrile.  No other urologic concerns       Past Medical History:   Past Medical History:   Diagnosis Date    Chronic kidney disease     Hyperlipidemia     Hypertension     Lumbar radiculopathy     Prediabetes     Scoliosis deformity of spine     Stroke        Past Surgical Historical:   Past Surgical History:   Procedure Laterality Date    Cysto, Clot Evacuation  11/21/2022    EXCISIONAL HEMORRHOIDECTOMY      PROSTATE BIOPSY          Medications:   Medication List with Changes/Refills   Current Medications    AMLODIPINE (NORVASC) 5 MG TABLET    TAKE 1 TABLET BY MOUTH ONCE DAILY    ASPIRIN (ECOTRIN) 81 MG EC TABLET    Take 81 mg by mouth once daily.    ATORVASTATIN (LIPITOR) 40 MG TABLET    Take 1 tablet (40 mg total) by mouth once daily.    BACLOFEN (LIORESAL) 10 MG TABLET    Take 1 tablet (10 mg total) by mouth after meals as needed (for pain).    CLOBETASOL (TEMOVATE) 0.05 % CREAM    APPLY LIBERALLY TO SKIN TWICE A DAY AS NEEDED    CLOPIDOGREL (PLAVIX) 75 MG TABLET    Take 1 tablet (75 mg total) by mouth once daily.    CLOTRIMAZOLE-BETAMETHASONE 1-0.05% (LOTRISONE) CREAM    Apply topically 2 (two) times daily.    FINASTERIDE (PROSCAR) 5 MG TABLET    Take 1 tablet by mouth once daily    HYDROCHLOROTHIAZIDE (HYDRODIURIL) 25 MG TABLET    TAKE 1 TABLET BY MOUTH ONCE DAILY    HYDROCODONE-ACETAMINOPHEN (NORCO)  MG PER TABLET    Take 1 tablet by mouth every 6 (six) hours as needed for Pain.     MELOXICAM (MOBIC) 7.5 MG TABLET    Take 7.5 mg by mouth 2 (two) times daily as needed.    METFORMIN (GLUCOPHAGE) 500 MG TABLET    TAKE 1 TABLET BY MOUTH ONCE DAILY    METOPROLOL SUCCINATE (TOPROL-XL) 25 MG 24 HR TABLET    Take 1 tablet (25 mg total) by mouth once daily.    WALKER MISC    Use when walking        Past Social History:   Social History     Socioeconomic History    Marital status:    Occupational History    Occupation: retired   Tobacco Use    Smoking status: Never    Smokeless tobacco: Never   Substance and Sexual Activity    Alcohol use: No    Drug use: No       Allergies:   Review of patient's allergies indicates:   Allergen Reactions    Levaquin [levofloxacin]         Family History:   Family History   Family history unknown: Yes        Review of Systems:  Review of Systems   Constitutional:  Negative for activity change and appetite change.   HENT:  Negative for congestion and dental problem.    Eyes:  Negative for visual disturbance.   Respiratory:  Negative for chest tightness and shortness of breath.    Cardiovascular:  Negative for chest pain.   Gastrointestinal:  Negative for abdominal distention and abdominal pain.   Genitourinary:  Negative for decreased urine volume, difficulty urinating, dysuria, enuresis, flank pain, frequency, genital sores, hematuria, penile discharge, penile pain, penile swelling, scrotal swelling, testicular pain and urgency.   Musculoskeletal:  Negative for back pain and neck pain.   Skin:  Negative for color change.   Neurological:  Negative for dizziness.   Hematological:  Negative for adenopathy.   Psychiatric/Behavioral:  Negative for agitation, behavioral problems and confusion.      Physical Exam:  Physical Exam  Constitutional:       Appearance: He is well-developed.   HENT:      Head: Normocephalic.   Eyes:      General: No scleral icterus.  Pulmonary:      Effort: Pulmonary effort is normal.      Breath sounds: Normal breath sounds.   Abdominal:       General: There is no distension.      Palpations: Abdomen is soft.      Tenderness: There is no abdominal tenderness.      Hernia: No hernia is present. There is no hernia in the right inguinal area or left inguinal area.   Genitourinary:     Penis: Normal.       Testes: Normal. Cremasteric reflex is present.   Musculoskeletal:      Cervical back: Normal range of motion.   Skin:     General: Skin is warm and dry.   Neurological:      Mental Status: He is alert and oriented to person, place, and time.       Assessment/Plan:   Leakage around the urinary catheter--since the patient is having bladder spasms.  Rx oxybutynin 5 mg 1 p.o. t.i.d. p.r.n. spasms.  Have the nurse going the room and we readjusted the way that the patient has his tubing plug down his legs so that he has an easier better flow without obstruction as well.  Urinalysis from the bag WBC 20 5-50 RBC 25-50 no skin cells 4+ bacteria will culture urine treat if indicated.  Keep appointment with Dr. Gardner next week as  Problem List Items Addressed This Visit    None  Visit Diagnoses       Status post cystoscopy    -  Primary    Relevant Orders    POCT Urinalysis (w/Micro Option)    Gross hematuria        Relevant Orders    POCT Urinalysis (w/Micro Option)    Presence of indwelling Hui catheter        Relevant Orders    POCT Urinalysis (w/Micro Option)

## 2022-12-11 LAB — URINE CULTURE, ROUTINE: NORMAL

## 2022-12-14 ENCOUNTER — TELEPHONE (OUTPATIENT)
Dept: UROLOGY | Facility: CLINIC | Age: 77
End: 2022-12-14
Payer: OTHER GOVERNMENT

## 2022-12-14 ENCOUNTER — NURSE TRIAGE (OUTPATIENT)
Dept: ADMINISTRATIVE | Facility: CLINIC | Age: 77
End: 2022-12-14
Payer: OTHER GOVERNMENT

## 2022-12-14 NOTE — TELEPHONE ENCOUNTER
He has a catheter bag. Last night he started with a small amount of blood and today he has more clots coming out like a streaming line.   Excruciating pain penis. Pt had a TURP on Nov 21. Care advice recommend pt go to Er. Cg refused. Cg is requesting for pt to come in to office to see MD. Please call and advise cg/pt [phone]  Reason for Disposition   Patient sounds very sick or weak to the triager    Additional Information   Negative: Shock suspected (e.g., cold/pale/clammy skin, too weak to stand, low BP, rapid pulse)   Negative: Sounds like a life-threatening emergency to the triager   Negative: Catheter was accidentally pulled-out and bright red continuous bleeding   Negative: SEVERE abdominal pain   Negative: Fever > 100.4 F (38.0 C)   Negative: Drinking very little and dehydration suspected (e.g., no urine > 12 hours, very dry mouth, very lightheaded)    Protocols used: Urinary Catheter (e.g., Hui) Symptoms and [id number]

## 2022-12-14 NOTE — TELEPHONE ENCOUNTER
MARIALUISA Null spoke with pt. Pt proceeding to ER.    ----- Message from Rosibel Mata sent at 12/14/2022  8:05 AM CST -----  Contact: self  Pt wife called and stated that pt has a lot of blood cloths passing through his tube. Pt can be reached at [phone]

## 2022-12-15 ENCOUNTER — OFFICE VISIT (OUTPATIENT)
Dept: UROLOGY | Facility: CLINIC | Age: 77
End: 2022-12-15
Payer: OTHER GOVERNMENT

## 2022-12-15 DIAGNOSIS — R33.9 URINARY RETENTION: Primary | ICD-10-CM

## 2022-12-15 PROCEDURE — 99024 PR POST-OP FOLLOW-UP VISIT: ICD-10-PCS | Mod: S$GLB,,, | Performed by: UROLOGY

## 2022-12-15 PROCEDURE — 99024 POSTOP FOLLOW-UP VISIT: CPT | Mod: S$GLB,,, | Performed by: UROLOGY

## 2022-12-15 NOTE — PROGRESS NOTES
Postop TURP doing well he did have some episodes of hematuria he has been on Plavix it is too late in the afternoon to pull his Hui we have instructed the wife to pull the Hui 1st thing in the morning tomorrow and continue off of Plavix until the bleeding stops

## 2022-12-16 ENCOUNTER — CLINICAL SUPPORT (OUTPATIENT)
Dept: UROLOGY | Facility: CLINIC | Age: 77
End: 2022-12-16
Payer: OTHER GOVERNMENT

## 2022-12-16 NOTE — PROGRESS NOTES
Patient post-op TURP with clot evacuation performed on 11/21/2022. Patient was seen by Dr. Gardner for post-op f/u yesterday. Hui catheter was not removed yesterday due to the late afternoon time, wife was given supplies & instructions on how to remove catheter for first thing this AM. Upon arrival for nurse visit for cath removal, wife states that during the night Mr. Fernandez used scissors to cut catheter & removed it himself. She took him to [facility] ER around 4 AM. Hui catheter was replaced at that time. Urine in  bag was dark cranberry colored upon inspection. Wife also states that he passed clots during night as well.   Call placed to Dr. Gardner, new orders given to leave catheter in place at this time to allow bladder to rest & to continue to hold plavix until bleeding has ceased. Patient & wife verbalized understanding. Instructed to call with any questions or concerns.     JONNIE Sanchez RN

## 2022-12-20 ENCOUNTER — TELEPHONE (OUTPATIENT)
Dept: UROLOGY | Facility: CLINIC | Age: 77
End: 2022-12-20
Payer: OTHER GOVERNMENT

## 2022-12-20 NOTE — TELEPHONE ENCOUNTER
Pt scheduled for catheter removal.     ----- Message from Luanne Kaiser sent at 12/20/2022  8:17 AM CST -----  Contact: [place] (Spouse)  Type:  Needs Medical Advice    Who Called: [place] (Spouse)  Symptoms (please be specific): He's back to his normal urine color, needing to have his catheter removed       Would the patient rather a call back or a response via [facility]? Call back  Best Call Back Number: [phone]  Additional Information: N/a

## 2022-12-21 ENCOUNTER — CLINICAL SUPPORT (OUTPATIENT)
Dept: UROLOGY | Facility: CLINIC | Age: 77
End: 2022-12-21
Payer: OTHER GOVERNMENT

## 2022-12-21 NOTE — PROGRESS NOTES
Patient here with wife for catheter removal. Urine to  bag clear yellow. 18 fr catheter removed after deflating balloon with tip intact with no difficulty. Patient tolerated well. Patient urinated approximately 15 cc immediately after catheter removal. Instructed patient to drink fluids to maintain hydration. Also instructed that if patient has not urinated by 4 PM this afternoon to call. Instructed patient that he may resume his plavix at this time. Both verbalized understanding.     JONNIE Sanchez RN

## 2022-12-22 ENCOUNTER — TELEPHONE (OUTPATIENT)
Dept: UROLOGY | Facility: CLINIC | Age: 77
End: 2022-12-22
Payer: OTHER GOVERNMENT

## 2022-12-22 NOTE — TELEPHONE ENCOUNTER
No answer, left message for wife to call back. Left message informing that no pain medication could be called out, recommended alternating tylenol & ibuprofen for any pain. Also instructed that he felt that he has UTI then he could come by & drop off urine sample.     JONNIE Sanchez Rn              ----- Message from Luanne Kaiser sent at 12/22/2022  8:31 AM CST -----  Regarding: Medication  Contact: Wife-[place]  Per phone donato with patient wife they are wanting the provider to donato in medication due to pain from the catheter removal. Patient has pain during urination. Please return call [phone]    [facility] [address] [place] [address] - [address] [address]  Phone: [phone] Fax: [phone]

## 2022-12-27 ENCOUNTER — TELEPHONE (OUTPATIENT)
Dept: UROLOGY | Facility: CLINIC | Age: 77
End: 2022-12-27
Payer: OTHER GOVERNMENT

## 2022-12-27 DIAGNOSIS — N43.3 HYDROCELE, UNSPECIFIED HYDROCELE TYPE: Primary | ICD-10-CM

## 2022-12-27 NOTE — TELEPHONE ENCOUNTER
Pt wife called stating pt was in the ER over the weekend with a swollen testicle. Pt had U/S done, Dr. Gardner reviewed. Pt was given levaquin in the hospital. Dr. Gardner advised pt to finish abx and have additional U/S in 1 month. Pt wife verbalized understanding. U/S order placed.     ----- Message from Rosibel Mata sent at 12/27/2022  8:15 AM CST -----  Contact: self  Pt wife called and stated pt had to go to the ER and his right testicle is in flame as well as his penis swollen. Pt can be reached at [phone]

## 2023-01-27 ENCOUNTER — TELEPHONE (OUTPATIENT)
Dept: UROLOGY | Facility: CLINIC | Age: 78
End: 2023-01-27
Payer: OTHER GOVERNMENT

## 2023-01-27 NOTE — TELEPHONE ENCOUNTER
Spoke with Wife [place] & informed her that US showed improvement since Mr. Fernandez has completed his antibiotics. Mrs. Fernandez stated that he is feeling much better since he finished his antibiotics. Instructed to call if any other issues or concerns arise. Verbalized understanding.     JONNIE Sanchez RN                  ----- Message from Duncan Gardner MD sent at 1/27/2023 11:02 AM CST -----  Testicle doing better after finishing abx. Please call pt and ask how is he doing.

## 2023-03-15 DIAGNOSIS — N40.0 BENIGN PROSTATIC HYPERPLASIA, UNSPECIFIED WHETHER LOWER URINARY TRACT SYMPTOMS PRESENT: ICD-10-CM

## 2023-03-17 ENCOUNTER — TELEPHONE (OUTPATIENT)
Dept: UROLOGY | Facility: CLINIC | Age: 78
End: 2023-03-17
Payer: OTHER GOVERNMENT

## 2023-03-17 NOTE — TELEPHONE ENCOUNTER
----- Message from Sonia Reeves sent at 3/17/2023 11:21 AM CDT -----  Contact: Diane([facility])  Diane called to consult with nurse or staff regarding clarification she is needing on a prescription that was faxed over to them. She would like a call back and can be reached at [phone]. Thanks/MR

## 2023-03-17 NOTE — TELEPHONE ENCOUNTER
Called out clotrimazole-betamethosone as requested to [facility].     JONNIE Sanchez RN                          ----- Message from Luanne Kaiser sent at 3/17/2023  9:32 AM CDT -----  Contact: [place] (spouse)  [place] (spouse) spoke to Diana two days ago about having clotrimazole-betamethasone 1-0.05% (LOTRISONE) cream called out to [address] - [address] [address]  Phone: [phone] Fax: [phone]    And it has not been called out yet. Please call back at [phone] or please call out asap

## 2023-03-17 NOTE — TELEPHONE ENCOUNTER
Attempted to call pharmacy 3 times. I waited on hold about 5-7 minutes each time and kept getting hung  up on. Lpn

## 2023-03-20 RX ORDER — CLOTRIMAZOLE AND BETAMETHASONE DIPROPIONATE 10; .64 MG/G; MG/G
CREAM TOPICAL 2 TIMES DAILY
Qty: 2 G | Refills: 2 | Status: SHIPPED | OUTPATIENT
Start: 2023-03-20

## 2023-03-20 RX ORDER — CLOTRIMAZOLE AND BETAMETHASONE DIPROPIONATE 10; .64 MG/G; MG/G
CREAM TOPICAL 2 TIMES DAILY
Qty: 1 G | Refills: 0 | Status: SHIPPED | OUTPATIENT
Start: 2023-03-20

## 2023-06-02 ENCOUNTER — TELEPHONE (OUTPATIENT)
Dept: UROLOGY | Facility: CLINIC | Age: 78
End: 2023-06-02
Payer: OTHER GOVERNMENT

## 2023-06-02 DIAGNOSIS — N43.3 HYDROCELE, UNSPECIFIED HYDROCELE TYPE: Primary | ICD-10-CM

## 2023-06-02 NOTE — TELEPHONE ENCOUNTER
----- Message from Janna Schaeffer sent at 6/2/2023  8:42 AM CDT -----  Type:  Needs Medical Advice    Who Called:  Rene JJ Fernandez ( pt's wife [place] )     Symptoms (please be specific): -   How long has patient had these symptoms:  -  Pharmacy name and phone #:  -  Would the patient rather a call back or a response via [facility]?    Best Call Back Number: [phone]  Additional Information: needs to speak w/ nurse about pt , says he feels like theres an increase in fluid in his scrotum, please call

## 2023-06-02 NOTE — TELEPHONE ENCOUNTER
"Spoke with wife verified in inv of care she stated pt is having some build up in his testicles. Pt denies any pain or swelling, redness/tenderness unk. Pt denies any recent activity which would cause problems. Pt stated "it just feels different than normal". Pt had US in past to eval for hydrocele. We will place another order to repeat scan. Will need [facility]. Wife informed and educated to call us back next week if they haven't heard from anyone to sched. Educated to monitor symptoms and if they worsen to report to ER over the weekend. They verbalized understanding. hMLpn  "

## 2023-06-06 ENCOUNTER — TELEPHONE (OUTPATIENT)
Dept: UROLOGY | Facility: CLINIC | Age: 78
End: 2023-06-06
Payer: OTHER GOVERNMENT

## 2023-06-06 NOTE — TELEPHONE ENCOUNTER
Spoke with wife and informed we are still waiting on authorization. Spoke with [facility] who had to resubmit auth. Wife verbalized understanding. Informed her if pain/swelling worsens to report to ER if we have not received auth. [facility] verbalized understanding. [facility]

## 2023-06-06 NOTE — TELEPHONE ENCOUNTER
----- Message from Radha Carver sent at 6/6/2023 10:13 AM CDT -----  Contact: pt  Pt wife Sharon  calling wanted to know if Guillermina has heard anything from [facility] yet.  Pt can be reached at [phone]     Thanks,

## 2023-06-15 ENCOUNTER — TELEPHONE (OUTPATIENT)
Dept: UROLOGY | Facility: CLINIC | Age: 78
End: 2023-06-15
Payer: OTHER GOVERNMENT

## 2023-06-15 NOTE — TELEPHONE ENCOUNTER
Attempted to contact, lm advising spouse that us has been authorized by insurance they can proceed to have it done. BJP    ----- Message from Sonia Reeves sent at 6/15/2023  2:58 PM CDT -----  Contact: Mrs. Fernandez(wife)  Mrs. Hinton called to consult with nurse or staff regarding a missed call. She states she has been trying to get an ultrasound scheduled and thought the call would be regarding this. She would like a call back and can be reached at [phone]. Thanks/MR

## 2023-06-23 ENCOUNTER — TELEPHONE (OUTPATIENT)
Dept: UROLOGY | Facility: CLINIC | Age: 78
End: 2023-06-23
Payer: OTHER GOVERNMENT

## 2023-06-23 NOTE — TELEPHONE ENCOUNTER
Spoke with pt and informed of results.     ----- Message from Ana Felton NP sent at 6/23/2023  8:55 AM CDT -----  Please notify patient of ultrasound results. Small right hydrocele noted. Follow up as needed

## 2024-03-28 ENCOUNTER — OFFICE VISIT (OUTPATIENT)
Dept: UROLOGY | Facility: CLINIC | Age: 79
End: 2024-03-28
Payer: MEDICARE

## 2024-03-28 VITALS
WEIGHT: 158.94 LBS | BODY MASS INDEX: 24.09 KG/M2 | TEMPERATURE: 98 F | OXYGEN SATURATION: 98 % | HEIGHT: 68 IN | SYSTOLIC BLOOD PRESSURE: 117 MMHG | DIASTOLIC BLOOD PRESSURE: 70 MMHG

## 2024-03-28 DIAGNOSIS — R39.15 URINARY URGENCY: ICD-10-CM

## 2024-03-28 DIAGNOSIS — N13.8 BPH WITH URINARY OBSTRUCTION: Primary | ICD-10-CM

## 2024-03-28 DIAGNOSIS — R35.0 URINARY FREQUENCY: ICD-10-CM

## 2024-03-28 DIAGNOSIS — R33.9 INCOMPLETE BLADDER EMPTYING: ICD-10-CM

## 2024-03-28 DIAGNOSIS — N40.1 BPH WITH URINARY OBSTRUCTION: Primary | ICD-10-CM

## 2024-03-28 PROCEDURE — 99214 OFFICE O/P EST MOD 30 MIN: CPT | Mod: S$GLB,,, | Performed by: NURSE PRACTITIONER

## 2024-03-28 RX ORDER — DIVALPROEX SODIUM 250 MG/1
250 TABLET, FILM COATED, EXTENDED RELEASE ORAL
COMMUNITY

## 2024-03-28 RX ORDER — LEVOFLOXACIN 500 MG/1
500 TABLET, FILM COATED ORAL
COMMUNITY
Start: 2022-12-26

## 2024-03-28 RX ORDER — CEFDINIR 300 MG/1
CAPSULE ORAL
COMMUNITY
Start: 2024-03-23

## 2024-03-28 RX ORDER — FUROSEMIDE 20 MG/1
TABLET ORAL
COMMUNITY

## 2024-03-28 RX ORDER — TAMSULOSIN HYDROCHLORIDE 0.4 MG/1
0.4 CAPSULE ORAL DAILY
Qty: 30 CAPSULE | Refills: 11 | Status: SHIPPED | OUTPATIENT
Start: 2024-03-28 | End: 2025-03-28

## 2024-03-28 RX ORDER — MOXIFLOXACIN 5 MG/ML
SOLUTION/ DROPS OPHTHALMIC
COMMUNITY
Start: 2024-02-16

## 2024-03-28 RX ORDER — GLUCOSAM/CHONDRO/HERB 149/HYAL 750-100 MG
1000 TABLET ORAL
COMMUNITY

## 2024-03-28 RX ORDER — DONEPEZIL HYDROCHLORIDE 5 MG/1
5 TABLET, FILM COATED ORAL
COMMUNITY
Start: 2023-10-26

## 2024-03-28 RX ORDER — GABAPENTIN 100 MG/1
100 CAPSULE ORAL NIGHTLY
COMMUNITY
Start: 2023-10-30

## 2024-03-28 NOTE — PROGRESS NOTES
Subjective:       Patient ID: Rene Fernandez is a 78 y.o. male.    Chief Complaint: No chief complaint on file.      HPI: 78-year-old male, established patient, seen December 2022.    Patient presents with complaint of burning with urination.  Also has frequency, urgency, and occasional leakage.    Denies any odor to the urine.  Denies any fever or body aches.  Denies any unexpected weight loss.    He is on Proscar 5 mg daily.   He states the burning is when he starts to urinate.  States his stream is pretty good but we can towards the end.  No other urinary complaints at this time.         Past Medical History:   Past Medical History:   Diagnosis Date    Chronic kidney disease     Hyperlipidemia     Hypertension     Lumbar radiculopathy     Prediabetes     Scoliosis deformity of spine     Stroke        Past Surgical Historical:   Past Surgical History:   Procedure Laterality Date    Cysto, Clot Evacuation  11/21/2022    EXCISIONAL HEMORRHOIDECTOMY      PROSTATE BIOPSY          Medications:   Medication List with Changes/Refills   New Medications    TAMSULOSIN (FLOMAX) 0.4 MG CAP    Take 1 capsule (0.4 mg total) by mouth once daily.   Current Medications    AMLODIPINE (NORVASC) 5 MG TABLET    TAKE 1 TABLET BY MOUTH ONCE DAILY    ASPIRIN (ECOTRIN) 81 MG EC TABLET    Take 81 mg by mouth once daily.    ATORVASTATIN (LIPITOR) 40 MG TABLET    Take 1 tablet (40 mg total) by mouth once daily.    BACLOFEN (LIORESAL) 10 MG TABLET    Take 1 tablet (10 mg total) by mouth after meals as needed (for pain).    CALCIUM CARBONATE-VITAMIN D3 600 MG-20 MCG (800 UNIT) CHEW    1 tablet with a meal Orally Once a day    CEFDINIR (OMNICEF) 300 MG CAPSULE    TAKE 1 CAPSULE BY MOUTH TWICE DAILY FOR 3 DAYS    CLOBETASOL (TEMOVATE) 0.05 % CREAM    APPLY LIBERALLY TO SKIN TWICE A DAY AS NEEDED    CLOPIDOGREL (PLAVIX) 75 MG TABLET    Take 1 tablet (75 mg total) by mouth once daily.    CLOTRIMAZOLE-BETAMETHASONE 1-0.05% (LOTRISONE) CREAM     Apply topically 2 (two) times daily.    CLOTRIMAZOLE-BETAMETHASONE 1-0.05% (LOTRISONE) CREAM    Apply topically 2 (two) times daily.    DIVALPROEX ER (DEPAKOTE ER) 250 MG 24 HR TABLET    Take 250 mg by mouth.    DONEPEZIL (ARICEPT) 5 MG TABLET    Take 5 mg by mouth.    FINASTERIDE (PROSCAR) 5 MG TABLET    Take 1 tablet by mouth once daily    FUROSEMIDE (LASIX) 20 MG TABLET    TAKE 1 TABLET BY MOUTH ONCE DAILY FOR FLUID RETENTION    GABAPENTIN (NEURONTIN) 100 MG CAPSULE    Take 100 mg by mouth every evening.    HYDROCHLOROTHIAZIDE (HYDRODIURIL) 25 MG TABLET    TAKE 1 TABLET BY MOUTH ONCE DAILY    HYDROCODONE-ACETAMINOPHEN (NORCO)  MG PER TABLET    Take 1 tablet by mouth every 6 (six) hours as needed for Pain.    LEVOFLOXACIN (LEVAQUIN) 500 MG TABLET    500 mg.    MELOXICAM (MOBIC) 7.5 MG TABLET    Take 7.5 mg by mouth 2 (two) times daily as needed.    METFORMIN (GLUCOPHAGE) 500 MG TABLET    TAKE 1 TABLET BY MOUTH ONCE DAILY    METOPROLOL SUCCINATE (TOPROL-XL) 25 MG 24 HR TABLET    Take 1 tablet (25 mg total) by mouth once daily.    MOXIFLOXACIN (VIGAMOX) 0.5 % OPHTHALMIC SOLUTION    INSTILL 1 DROP FOUR TIMES DAILY FOR 1 DAY PREOP, THEN FOUR TIMES DAILY DAY OF SURGERY, THEN FOUR TIMES DAILY FOR 1 WEEK IN OPERATIVE EYE    OMEGA 3-DHA-EPA-FISH OIL 1,000 MG (120 MG-180 MG) CAP    1,000 mg.    OXYBUTYNIN (DITROPAN) 5 MG TAB    Take 1 tablet (5 mg total) by mouth 3 (three) times daily.    WALKER MISC    Use when walking        Past Social History:   Social History     Socioeconomic History    Marital status:    Occupational History    Occupation: retired   Tobacco Use    Smoking status: Never     Passive exposure: Past    Smokeless tobacco: Never   Substance and Sexual Activity    Alcohol use: No    Drug use: No       Allergies:   Review of patient's allergies indicates:   Allergen Reactions    Shellfish containing products     Simvastatin Itching        Family History:   Family History   Family history  unknown: Yes        Review of Systems:  Review of Systems   Constitutional:  Negative for activity change and appetite change.   HENT:  Negative for congestion and dental problem.    Eyes:  Negative for visual disturbance.   Respiratory:  Negative for chest tightness and shortness of breath.    Cardiovascular:  Negative for chest pain.   Gastrointestinal:  Negative for abdominal distention and abdominal pain.   Genitourinary:  Positive for dysuria, frequency and urgency. Negative for decreased urine volume, difficulty urinating, enuresis, flank pain, genital sores, hematuria, penile discharge, penile pain, penile swelling, scrotal swelling and testicular pain.   Musculoskeletal:  Negative for back pain and neck pain.   Skin:  Negative for color change.   Neurological:  Negative for dizziness.   Hematological:  Negative for adenopathy.   Psychiatric/Behavioral:  Negative for agitation, behavioral problems and confusion.        Physical Exam:  Physical Exam  Vitals and nursing note reviewed.   Constitutional:       Appearance: He is well-developed.   HENT:      Head: Normocephalic.   Eyes:      Pupils: Pupils are equal, round, and reactive to light.   Cardiovascular:      Rate and Rhythm: Normal rate and regular rhythm.      Heart sounds: Normal heart sounds.   Pulmonary:      Effort: Pulmonary effort is normal.      Breath sounds: Normal breath sounds.   Abdominal:      General: Bowel sounds are normal.      Palpations: Abdomen is soft.   Musculoskeletal:         General: Normal range of motion.      Cervical back: Normal range of motion and neck supple.   Skin:     General: Skin is warm and dry.   Neurological:      Mental Status: He is alert and oriented to person, place, and time.   Psychiatric:         Behavior: Behavior normal.       Bladder scan:  143 cc    Assessment/Plan:   1. BPH with obstruction/frequency/urgency/incomplete bladder emptying:  Patient's bladder scan is elevated 143 cc.    Does have frequency  and urgency.  His burning may be associated with BPH.    Patient continue Proscar 5 mg daily.  Will add Flomax 0.4 mg daily.      Patient follow-up in 6-8 weeks for re-evaluation, sooner if needed.  Problem List Items Addressed This Visit    None  Visit Diagnoses       BPH with urinary obstruction    -  Primary    Relevant Medications    tamsulosin (FLOMAX) 0.4 mg Cap    Other Relevant Orders    POCT Bladder Scan    Incomplete bladder emptying        Relevant Medications    tamsulosin (FLOMAX) 0.4 mg Cap    Urinary frequency        Relevant Orders    POCT Bladder Scan    Urinary urgency        Relevant Orders    POCT Bladder Scan

## 2024-04-16 ENCOUNTER — TELEPHONE (OUTPATIENT)
Dept: UROLOGY | Facility: CLINIC | Age: 79
End: 2024-04-16
Payer: OTHER GOVERNMENT

## 2024-04-16 NOTE — TELEPHONE ENCOUNTER
----- Message from Jacy Colvin sent at 4/15/2024  5:47 PM CDT -----  Contact: [place], wife    ----- Message -----  From: Lorelei Pereyra  Sent: 4/15/2024   9:29 AM CDT  To: Teodoro Kam Staff    Per phone call with [place], she stated that he was put on medication Tamsulosin and his problem is not getting any better with the burning sensation when urinating and it has a orange color.  Please return call at [phone] (home).    Thanks,  SJ

## 2024-04-16 NOTE — TELEPHONE ENCOUNTER
Spouse will come by and  ua cup. It was prepared and put in bag at check in on [facility] side.    I again reminded her it must be brought in within 30 minutes or up to 2 hrs cold.

## 2024-04-16 NOTE — TELEPHONE ENCOUNTER
Pt's spouse, (on [facility]), reports pt is still burning with the flomax. She also reports his urine has had a red tint to it, intermittently. F/U apt 5/15. I asked if she could bring him in for UA drop off but she reports that would be very difficult but could get ua sample and bring it in. I advised it must be within 30 minutes or kept cold up to 2 hrs.    Please advise what you would like.

## 2024-04-18 ENCOUNTER — CLINICAL SUPPORT (OUTPATIENT)
Dept: UROLOGY | Facility: CLINIC | Age: 79
End: 2024-04-18
Payer: MEDICARE

## 2024-04-18 DIAGNOSIS — R30.0 DYSURIA: Primary | ICD-10-CM

## 2024-04-18 LAB
BILIRUBIN, UA POC OHS: NEGATIVE
BLOOD, UA POC OHS: NEGATIVE
CLARITY, UA POC OHS: CLEAR
COLOR, UA POC OHS: YELLOW
GLUCOSE, UA POC OHS: NEGATIVE
KETONES, UA POC OHS: NEGATIVE
LEUKOCYTES, UA POC OHS: NEGATIVE
NITRITE, UA POC OHS: NEGATIVE
PH, UA POC OHS: 6.5
PROTEIN, UA POC OHS: NEGATIVE
SPECIFIC GRAVITY, UA POC OHS: 1.02
UROBILINOGEN, UA POC OHS: 0.2

## 2024-04-18 PROCEDURE — 81003 URINALYSIS AUTO W/O SCOPE: CPT | Mod: QW,S$GLB,, | Performed by: NURSE PRACTITIONER

## 2024-04-26 ENCOUNTER — OFFICE VISIT (OUTPATIENT)
Dept: UROLOGY | Facility: CLINIC | Age: 79
End: 2024-04-26
Payer: MEDICARE

## 2024-04-26 VITALS
WEIGHT: 158.94 LBS | HEART RATE: 84 BPM | DIASTOLIC BLOOD PRESSURE: 68 MMHG | SYSTOLIC BLOOD PRESSURE: 100 MMHG | OXYGEN SATURATION: 96 % | BODY MASS INDEX: 24.09 KG/M2 | HEIGHT: 68 IN | TEMPERATURE: 98 F

## 2024-04-26 DIAGNOSIS — R33.9 URINARY RETENTION: Primary | ICD-10-CM

## 2024-04-26 LAB
BILIRUBIN, UA POC OHS: NEGATIVE
BLOOD, UA POC OHS: ABNORMAL
CLARITY, UA POC OHS: CLEAR
COLOR, UA POC OHS: YELLOW
GLUCOSE, UA POC OHS: NEGATIVE
KETONES, UA POC OHS: NEGATIVE
LEUKOCYTES, UA POC OHS: NEGATIVE
NITRITE, UA POC OHS: NEGATIVE
PH, UA POC OHS: 7
PROTEIN, UA POC OHS: NEGATIVE
SPECIFIC GRAVITY, UA POC OHS: 1.01
UROBILINOGEN, UA POC OHS: 0.2

## 2024-04-26 PROCEDURE — 81003 URINALYSIS AUTO W/O SCOPE: CPT | Mod: QW,S$GLB,,

## 2024-04-26 PROCEDURE — 99214 OFFICE O/P EST MOD 30 MIN: CPT | Mod: S$GLB,,,

## 2024-04-26 NOTE — PROGRESS NOTES
Subjective:       Patient ID: Rene Fernandez is a 78 y.o. male.    Chief Complaint: Urinary Tract Infection      HPI: 78-year-old male established patient presents today with complaints of dysuria.  One week ago patient was having some dysuria and urinary frequency and provided a urine drop off here in the clinic which was negative.  Patient's symptoms have not improved so he is here today with concerns of having a urinary tract infection.    Patient had a TURP with Dr. Gardner in November of 2022.  After the TURP he developed some significant bleeding which ultimately resulted in having a clot evacuation with fulguration.  Patient recovered well and in March patient was seen here in our office due to complaints of burning with frequency and urgency.  At that time patient's PVR was 143 mL and he was started on Flomax.    Today patient's PVR is 386 mL.  Daughter reports he is able to urinate but it is very small amounts.  Patient complains he is still having dysuria with urgency and frequency.  Patient is unable to provide urine sample during visit despite highly elevated PVR.  Upon attempting to straight cath patient resistance was met with advancement of the 16f Hui.  14F coude was required to enter the urinary bladder.  At this time we decided to leave the indwelling Hui to gravity to a leg bag due to urinary retention       Past Medical History:   Past Medical History:   Diagnosis Date    Chronic kidney disease     Hyperlipidemia     Hypertension     Lumbar radiculopathy     Prediabetes     Scoliosis deformity of spine     Stroke        Past Surgical Historical:   Past Surgical History:   Procedure Laterality Date    Cysto, Clot Evacuation  11/21/2022    EXCISIONAL HEMORRHOIDECTOMY      PROSTATE BIOPSY          Medications:   Medication List with Changes/Refills   Current Medications    AMLODIPINE (NORVASC) 5 MG TABLET    TAKE 1 TABLET BY MOUTH ONCE DAILY    ASPIRIN (ECOTRIN) 81 MG EC TABLET    Take 81 mg by  mouth once daily.    ATORVASTATIN (LIPITOR) 40 MG TABLET    Take 1 tablet (40 mg total) by mouth once daily.    BACLOFEN (LIORESAL) 10 MG TABLET    Take 1 tablet (10 mg total) by mouth after meals as needed (for pain).    CALCIUM CARBONATE-VITAMIN D3 600 MG-20 MCG (800 UNIT) CHEW    1 tablet with a meal Orally Once a day    CEFDINIR (OMNICEF) 300 MG CAPSULE    TAKE 1 CAPSULE BY MOUTH TWICE DAILY FOR 3 DAYS    CLOBETASOL (TEMOVATE) 0.05 % CREAM    APPLY LIBERALLY TO SKIN TWICE A DAY AS NEEDED    CLOPIDOGREL (PLAVIX) 75 MG TABLET    Take 1 tablet (75 mg total) by mouth once daily.    CLOTRIMAZOLE-BETAMETHASONE 1-0.05% (LOTRISONE) CREAM    Apply topically 2 (two) times daily.    CLOTRIMAZOLE-BETAMETHASONE 1-0.05% (LOTRISONE) CREAM    Apply topically 2 (two) times daily.    DIVALPROEX ER (DEPAKOTE ER) 250 MG 24 HR TABLET    Take 250 mg by mouth.    DONEPEZIL (ARICEPT) 5 MG TABLET    Take 5 mg by mouth.    FINASTERIDE (PROSCAR) 5 MG TABLET    Take 1 tablet by mouth once daily    FUROSEMIDE (LASIX) 20 MG TABLET    TAKE 1 TABLET BY MOUTH ONCE DAILY FOR FLUID RETENTION    GABAPENTIN (NEURONTIN) 100 MG CAPSULE    Take 100 mg by mouth every evening.    HYDROCHLOROTHIAZIDE (HYDRODIURIL) 25 MG TABLET    TAKE 1 TABLET BY MOUTH ONCE DAILY    HYDROCODONE-ACETAMINOPHEN (NORCO)  MG PER TABLET    Take 1 tablet by mouth every 6 (six) hours as needed for Pain.    LEVOFLOXACIN (LEVAQUIN) 500 MG TABLET    500 mg.    MELOXICAM (MOBIC) 7.5 MG TABLET    Take 7.5 mg by mouth 2 (two) times daily as needed.    METFORMIN (GLUCOPHAGE) 500 MG TABLET    TAKE 1 TABLET BY MOUTH ONCE DAILY    METOPROLOL SUCCINATE (TOPROL-XL) 25 MG 24 HR TABLET    Take 1 tablet (25 mg total) by mouth once daily.    MOXIFLOXACIN (VIGAMOX) 0.5 % OPHTHALMIC SOLUTION    INSTILL 1 DROP FOUR TIMES DAILY FOR 1 DAY PREOP, THEN FOUR TIMES DAILY DAY OF SURGERY, THEN FOUR TIMES DAILY FOR 1 WEEK IN OPERATIVE EYE    OMEGA 3-DHA-EPA-FISH OIL 1,000 MG (120 MG-180 MG) CAP     1,000 mg.    TAMSULOSIN (FLOMAX) 0.4 MG CAP    Take 1 capsule (0.4 mg total) by mouth once daily.    WALKER MISC    Use when walking   Discontinued Medications    OXYBUTYNIN (DITROPAN) 5 MG TAB    Take 1 tablet (5 mg total) by mouth 3 (three) times daily.        Past Social History:   Social History     Socioeconomic History    Marital status:    Occupational History    Occupation: retired   Tobacco Use    Smoking status: Never     Passive exposure: Past    Smokeless tobacco: Never   Substance and Sexual Activity    Alcohol use: No    Drug use: No    Sexual activity: Not Currently       Allergies:   Review of patient's allergies indicates:   Allergen Reactions    Shellfish containing products     Simvastatin Itching        Family History:   Family History   Family history unknown: Yes        Review of Systems:  Review of Systems   Constitutional: Negative.    HENT: Negative.     Eyes: Negative.    Respiratory: Negative.     Cardiovascular: Negative.    Gastrointestinal: Negative.    Endocrine: Negative.    Genitourinary:  Positive for difficulty urinating, dysuria, frequency and urgency.   Musculoskeletal: Negative.    Skin: Negative.    Allergic/Immunologic: Negative.    Neurological: Negative.    Hematological: Negative.    Psychiatric/Behavioral: Negative.       Physical Exam:  Physical Exam  Constitutional:       Appearance: Normal appearance.   Cardiovascular:      Rate and Rhythm: Normal rate.   Pulmonary:      Effort: Pulmonary effort is normal.   Abdominal:      General: Bowel sounds are normal.      Palpations: Abdomen is soft.   Genitourinary:     Testes: Normal.   Neurological:      Mental Status: He is alert and oriented to person, place, and time.   Urinalysis:  Negative for infection.  He did have a significant amount of red blood cells however this is from the trauma of inserting the Hui cath.     mL  Assessment/Plan:     Urinary retention:  We will leave the 14 [place] indwelling Hui  cath in due to urinary retention and schedule patient with Dr. Gardner for repeat cystoscopy to evaluate the reason for his urinary retention.  Problem List Items Addressed This Visit    None  Visit Diagnoses       Urinary retention    -  Primary    Relevant Orders    POCT Urinalysis(Instrument) (Completed)    Urine culture

## 2024-05-24 ENCOUNTER — TELEPHONE (OUTPATIENT)
Dept: UROLOGY | Facility: CLINIC | Age: 79
End: 2024-05-24
Payer: OTHER GOVERNMENT

## 2024-05-24 NOTE — TELEPHONE ENCOUNTER
Spoke with pt wife regarding her  passing away. I gave our condolences to pt wife. She wanted to thank our office for everything we did for her .        ----- Message from Caro Forte sent at 5/24/2024  8:32 AM CDT -----  Contact: [place] isaac to pt  Type: Staff Message    Caller: [place] wife to pt   Call Back Number: [phone]  Nature of the Call: Wife is calling in regards to  passing on Tuesday 05/21/2024. Please acknowledge?   Additional Information: na